# Patient Record
Sex: FEMALE | Race: WHITE | Employment: OTHER | URBAN - METROPOLITAN AREA
[De-identification: names, ages, dates, MRNs, and addresses within clinical notes are randomized per-mention and may not be internally consistent; named-entity substitution may affect disease eponyms.]

---

## 2018-12-20 ENCOUNTER — HOSPITAL ENCOUNTER (INPATIENT)
Age: 73
LOS: 5 days | Discharge: HOME HEALTH | DRG: 560 | End: 2018-12-25
Attending: PHYSICAL MEDICINE & REHABILITATION | Admitting: PHYSICAL MEDICINE & REHABILITATION
Payer: MEDICARE

## 2018-12-20 DIAGNOSIS — Z98.1 S/P LUMBAR SPINAL FUSION: Primary | ICD-10-CM

## 2018-12-20 PROBLEM — R26.9 GAIT DISTURBANCE: Status: ACTIVE | Noted: 2018-12-20

## 2018-12-20 PROBLEM — M47.816 SPONDYLOSIS OF LUMBAR SPINE: Status: ACTIVE | Noted: 2018-12-20

## 2018-12-20 PROBLEM — E11.65 UNCONTROLLED TYPE 2 DIABETES MELLITUS WITH HYPERGLYCEMIA (HCC): Status: ACTIVE | Noted: 2018-12-20

## 2018-12-20 PROBLEM — M48.061 SPINAL STENOSIS OF LUMBAR REGION WITH RADICULOPATHY: Status: ACTIVE | Noted: 2018-12-20

## 2018-12-20 PROBLEM — N99.89 POSTOPERATIVE URINARY RETENTION: Status: ACTIVE | Noted: 2018-12-20

## 2018-12-20 PROBLEM — I10 ESSENTIAL HYPERTENSION: Status: ACTIVE | Noted: 2018-12-20

## 2018-12-20 PROBLEM — R52 UNCONTROLLED PAIN: Status: ACTIVE | Noted: 2018-12-20

## 2018-12-20 PROBLEM — R33.8 POSTOPERATIVE URINARY RETENTION: Status: ACTIVE | Noted: 2018-12-20

## 2018-12-20 PROBLEM — M54.16 SPINAL STENOSIS OF LUMBAR REGION WITH RADICULOPATHY: Status: ACTIVE | Noted: 2018-12-20

## 2018-12-20 LAB
GLUCOSE BLD-MCNC: 143 MG/DL (ref 70–99)
GLUCOSE BLD-MCNC: 181 MG/DL (ref 70–99)

## 2018-12-20 PROCEDURE — 51798 US URINE CAPACITY MEASURE: CPT

## 2018-12-20 PROCEDURE — 51701 INSERT BLADDER CATHETER: CPT

## 2018-12-20 PROCEDURE — 1280000000 HC REHAB R&B

## 2018-12-20 PROCEDURE — 99223 1ST HOSP IP/OBS HIGH 75: CPT | Performed by: PHYSICAL MEDICINE & REHABILITATION

## 2018-12-20 PROCEDURE — 82962 GLUCOSE BLOOD TEST: CPT

## 2018-12-20 PROCEDURE — 6370000000 HC RX 637 (ALT 250 FOR IP): Performed by: PHYSICAL MEDICINE & REHABILITATION

## 2018-12-20 RX ORDER — ASPIRIN 81 MG/1
81 TABLET, CHEWABLE ORAL DAILY
Status: DISCONTINUED | OUTPATIENT
Start: 2018-12-21 | End: 2018-12-25 | Stop reason: HOSPADM

## 2018-12-20 RX ORDER — GLYBURIDE 5 MG/1
10 TABLET ORAL 2 TIMES DAILY WITH MEALS
Status: DISCONTINUED | OUTPATIENT
Start: 2018-12-20 | End: 2018-12-25 | Stop reason: HOSPADM

## 2018-12-20 RX ORDER — ASPIRIN 81 MG/1
81 TABLET, CHEWABLE ORAL DAILY
COMMUNITY

## 2018-12-20 RX ORDER — BISACODYL 10 MG
10 SUPPOSITORY, RECTAL RECTAL DAILY PRN
Status: DISCONTINUED | OUTPATIENT
Start: 2018-12-20 | End: 2018-12-25 | Stop reason: HOSPADM

## 2018-12-20 RX ORDER — DEXTROSE MONOHYDRATE 50 MG/ML
100 INJECTION, SOLUTION INTRAVENOUS PRN
Status: DISCONTINUED | OUTPATIENT
Start: 2018-12-20 | End: 2018-12-25 | Stop reason: HOSPADM

## 2018-12-20 RX ORDER — PANTOPRAZOLE SODIUM 40 MG/1
40 TABLET, DELAYED RELEASE ORAL
Status: DISCONTINUED | OUTPATIENT
Start: 2018-12-21 | End: 2018-12-25 | Stop reason: HOSPADM

## 2018-12-20 RX ORDER — ACETAMINOPHEN 325 MG/1
650 TABLET ORAL EVERY 4 HOURS PRN
Status: DISCONTINUED | OUTPATIENT
Start: 2018-12-20 | End: 2018-12-25 | Stop reason: HOSPADM

## 2018-12-20 RX ORDER — DOCUSATE SODIUM 100 MG/1
100 CAPSULE, LIQUID FILLED ORAL DAILY
Status: DISCONTINUED | OUTPATIENT
Start: 2018-12-21 | End: 2018-12-25 | Stop reason: HOSPADM

## 2018-12-20 RX ORDER — POTASSIUM CHLORIDE 20 MEQ/1
20 TABLET, EXTENDED RELEASE ORAL 2 TIMES DAILY WITH MEALS
Status: DISCONTINUED | OUTPATIENT
Start: 2018-12-20 | End: 2018-12-25 | Stop reason: HOSPADM

## 2018-12-20 RX ORDER — TRIAMTERENE AND HYDROCHLOROTHIAZIDE 37.5; 25 MG/1; MG/1
1 TABLET ORAL DAILY
Status: DISCONTINUED | OUTPATIENT
Start: 2018-12-21 | End: 2018-12-25 | Stop reason: HOSPADM

## 2018-12-20 RX ORDER — OXYCODONE HYDROCHLORIDE AND ACETAMINOPHEN 5; 325 MG/1; MG/1
2 TABLET ORAL EVERY 4 HOURS PRN
Status: DISCONTINUED | OUTPATIENT
Start: 2018-12-20 | End: 2018-12-25 | Stop reason: HOSPADM

## 2018-12-20 RX ORDER — CETIRIZINE HYDROCHLORIDE 10 MG/1
10 TABLET ORAL NIGHTLY
Status: DISCONTINUED | OUTPATIENT
Start: 2018-12-20 | End: 2018-12-25 | Stop reason: HOSPADM

## 2018-12-20 RX ORDER — POTASSIUM CHLORIDE 20 MEQ/1
20 TABLET, EXTENDED RELEASE ORAL 2 TIMES DAILY
COMMUNITY

## 2018-12-20 RX ORDER — DEXTROSE MONOHYDRATE 25 G/50ML
12.5 INJECTION, SOLUTION INTRAVENOUS PRN
Status: DISCONTINUED | OUTPATIENT
Start: 2018-12-20 | End: 2018-12-25 | Stop reason: HOSPADM

## 2018-12-20 RX ORDER — OXYCODONE HYDROCHLORIDE AND ACETAMINOPHEN 5; 325 MG/1; MG/1
1 TABLET ORAL EVERY 4 HOURS PRN
Status: DISCONTINUED | OUTPATIENT
Start: 2018-12-20 | End: 2018-12-25 | Stop reason: HOSPADM

## 2018-12-20 RX ORDER — ATENOLOL 50 MG/1
50 TABLET ORAL 2 TIMES DAILY
Status: DISCONTINUED | OUTPATIENT
Start: 2018-12-20 | End: 2018-12-25 | Stop reason: HOSPADM

## 2018-12-20 RX ORDER — NICOTINE POLACRILEX 4 MG
15 LOZENGE BUCCAL PRN
Status: DISCONTINUED | OUTPATIENT
Start: 2018-12-20 | End: 2018-12-25 | Stop reason: HOSPADM

## 2018-12-20 RX ORDER — INSULIN GLARGINE 100 [IU]/ML
24 INJECTION, SOLUTION SUBCUTANEOUS NIGHTLY
Status: DISCONTINUED | OUTPATIENT
Start: 2018-12-20 | End: 2018-12-25 | Stop reason: HOSPADM

## 2018-12-20 RX ORDER — OMEGA-3-ACID ETHYL ESTERS 1 G/1
1 CAPSULE, LIQUID FILLED ORAL 2 TIMES DAILY
Status: DISCONTINUED | OUTPATIENT
Start: 2018-12-20 | End: 2018-12-25 | Stop reason: HOSPADM

## 2018-12-20 RX ADMIN — ATENOLOL 50 MG: 50 TABLET ORAL at 20:58

## 2018-12-20 RX ADMIN — GLYBURIDE 10 MG: 5 TABLET ORAL at 18:48

## 2018-12-20 RX ADMIN — INSULIN LISPRO 1 UNITS: 100 INJECTION, SOLUTION INTRAVENOUS; SUBCUTANEOUS at 21:05

## 2018-12-20 RX ADMIN — POTASSIUM CHLORIDE 20 MEQ: 20 TABLET, EXTENDED RELEASE ORAL at 18:48

## 2018-12-20 RX ADMIN — OXYCODONE AND ACETAMINOPHEN 1 TABLET: 5; 325 TABLET ORAL at 20:57

## 2018-12-20 RX ADMIN — INSULIN GLARGINE 24 UNITS: 100 INJECTION, SOLUTION SUBCUTANEOUS at 21:04

## 2018-12-20 RX ADMIN — OMEGA-3-ACID ETHYL ESTERS 1 G: 1 CAPSULE, LIQUID FILLED ORAL at 20:58

## 2018-12-20 RX ADMIN — CETIRIZINE HYDROCHLORIDE 10 MG: 10 TABLET, FILM COATED ORAL at 21:03

## 2018-12-20 ASSESSMENT — PAIN DESCRIPTION - PAIN TYPE: TYPE: CHRONIC PAIN

## 2018-12-20 ASSESSMENT — PAIN SCALES - GENERAL
PAINLEVEL_OUTOF10: 5
PAINLEVEL_OUTOF10: 8

## 2018-12-20 NOTE — PLAN OF CARE
management and Education  Precautions   [] Weight Bearing Precautions   [] Swallowing Precautions   [x] Monitoring of Risks of Complications   [x] DVT Prophylaxis    [x] Fluid/electrolyte/Nutrition Management    [x] Complete education with patient/family with understanding demonstrated for          in-room safety with transfers to bed, toilet, wheelchair, shower as well as                bathroom activities and hygiene. [] Adjustment   [] Other:   Nursing interventions may include bowel/bladder training, education for medical assistive devices, medication education, O2 saturation management, energy conservation, stress management techniques, fall prevention, alarms protocol, seating and positioning, skin/wound care, pressure relief instruction,dressing changes,  infection protection, DVT prophylaxis, and/or assistance with in room safety with transfers to bed, toilet, wheelchair, shower as well as bathroom activities and hygiene.      Patient/caregiver education for:   [x] Disease/sustained injury/management      [x] Medication Use   [] Surgical intervention   [x] Safety/Precautions   [x] Body mechanics and or joint protection   [x] Health maintenance         PHYSICAL THERAPY:  Goals:                  Short term goals  Time Frame for Short term goals: 7-10 days  Short term goal 1: pt will be mod I c bed mobility, demonstrating safety with a log-roll technique  Short term goal 2: pt will be mod I c transfers, including toilet transfers, bed to chair, car transfers, sit/stands  Short term goal 3: pt will be mod I c ambulating 150 ft c LRAD   Short term goal 4: pt will be mod I c ascending 1 curb step c LRAD, in order to safely navigate the community  Short term goal 5: pt will be mod I c higher level mobility, including ambulating over uneven surfaces, picking an object up from the floor and dynamic standing activities with unilateral UE support, in order to increase overall independence throughout the community Long term goals  Time Frame for Long term goals : LTG = STG  Long term goal 1: Pt will be able to recall spinal precautions and describe functional applications of spinal precautions 3/3 times. Long term goal 2: Pt will be independent with a HEP to promote further BLE and core strength, to promote longterm strength and endurance  Long term goal 3: Pt will report reduced pain to a 4/10 with activity. Long term goal 4: Pt will be mod I c ascending/descending 4 stairs. These goals were reviewed with this patient at the time of assessment and Leslie Vicente is in agreement. Plan of Care: Pt to be seen 5 days per week for a minimum of 60 minutes for  7-10 days. Current Treatment Recommendations: Strengthening, ROM, Transfer Training, Functional Mobility Training, IADL Training, Gait Training, Stair training, Endurance Training, Balance Training, Manual Therapy - Joint Manipulation, Manual Therapy - Soft Tissue Mobilization, Home Exercise Program, Equipment Evaluation, Education, & procurement, Modalities, Safety Education & Training, Pain Management, Patient/Caregiver Education & Training community reintegration,and concurrent/group therapy. OCCUPATIONAL THERAPY:  Goals:             Short term goals  Time Frame for Short term goals: STGs=LTGs :  Long term goals  Time Frame for Long term goals : 7-10 days or until d/c. Long term goal 1: Pt will complete toileting mod I.  Long term goal 2: Pt will complete UB dressing I; LB dressing mod I using AE. Long term goal 3: Pt will complete grooming tasks I. Long term goal 4: Pt will complete total body bathing mod I.  Long term goal 5: Pt will complete functional transfers (bed, chair, shower, toilet) c DME PRN and mod I.   Long term goals 6: Pt will perform therex/therax to facilitate increased strength/endurance/ax tolerance (c emphasis on dynamic standing balance/tolerance >12 mins) c SBA.   Long term goal 7: Pt will complete simple

## 2018-12-21 LAB
ANION GAP SERPL CALCULATED.3IONS-SCNC: 11 MMOL/L (ref 4–16)
BUN BLDV-MCNC: 17 MG/DL (ref 6–23)
CALCIUM SERPL-MCNC: 8.5 MG/DL (ref 8.3–10.6)
CHLORIDE BLD-SCNC: 98 MMOL/L (ref 99–110)
CO2: 27 MMOL/L (ref 21–32)
CREAT SERPL-MCNC: 1 MG/DL (ref 0.6–1.1)
GFR AFRICAN AMERICAN: >60 ML/MIN/1.73M2
GFR NON-AFRICAN AMERICAN: 54 ML/MIN/1.73M2
GLUCOSE BLD-MCNC: 150 MG/DL (ref 70–99)
GLUCOSE BLD-MCNC: 162 MG/DL (ref 70–99)
GLUCOSE BLD-MCNC: 162 MG/DL (ref 70–99)
GLUCOSE BLD-MCNC: 166 MG/DL (ref 70–99)
GLUCOSE BLD-MCNC: 189 MG/DL (ref 70–99)
HCT VFR BLD CALC: 40.3 % (ref 37–47)
HEMOGLOBIN: 13.1 GM/DL (ref 12.5–16)
MCH RBC QN AUTO: 31.2 PG (ref 27–31)
MCHC RBC AUTO-ENTMCNC: 32.5 % (ref 32–36)
MCV RBC AUTO: 96 FL (ref 78–100)
PDW BLD-RTO: 13.2 % (ref 11.7–14.9)
PLATELET # BLD: 111 K/CU MM (ref 140–440)
PMV BLD AUTO: 10.1 FL (ref 7.5–11.1)
POTASSIUM SERPL-SCNC: 3.5 MMOL/L (ref 3.5–5.1)
RBC # BLD: 4.2 M/CU MM (ref 4.2–5.4)
SODIUM BLD-SCNC: 136 MMOL/L (ref 135–145)
WBC # BLD: 7.8 K/CU MM (ref 4–10.5)

## 2018-12-21 PROCEDURE — 97530 THERAPEUTIC ACTIVITIES: CPT

## 2018-12-21 PROCEDURE — 1280000000 HC REHAB R&B

## 2018-12-21 PROCEDURE — 97166 OT EVAL MOD COMPLEX 45 MIN: CPT

## 2018-12-21 PROCEDURE — 94761 N-INVAS EAR/PLS OXIMETRY MLT: CPT

## 2018-12-21 PROCEDURE — 6370000000 HC RX 637 (ALT 250 FOR IP): Performed by: PHYSICAL MEDICINE & REHABILITATION

## 2018-12-21 PROCEDURE — 97116 GAIT TRAINING THERAPY: CPT

## 2018-12-21 PROCEDURE — 94664 DEMO&/EVAL PT USE INHALER: CPT

## 2018-12-21 PROCEDURE — 51798 US URINE CAPACITY MEASURE: CPT

## 2018-12-21 PROCEDURE — 36415 COLL VENOUS BLD VENIPUNCTURE: CPT

## 2018-12-21 PROCEDURE — 97535 SELF CARE MNGMENT TRAINING: CPT

## 2018-12-21 PROCEDURE — 97110 THERAPEUTIC EXERCISES: CPT

## 2018-12-21 PROCEDURE — 80048 BASIC METABOLIC PNL TOTAL CA: CPT

## 2018-12-21 PROCEDURE — 99232 SBSQ HOSP IP/OBS MODERATE 35: CPT | Performed by: PHYSICAL MEDICINE & REHABILITATION

## 2018-12-21 PROCEDURE — 2500000003 HC RX 250 WO HCPCS: Performed by: PHYSICAL MEDICINE & REHABILITATION

## 2018-12-21 PROCEDURE — 82962 GLUCOSE BLOOD TEST: CPT

## 2018-12-21 PROCEDURE — 85027 COMPLETE CBC AUTOMATED: CPT

## 2018-12-21 PROCEDURE — 94150 VITAL CAPACITY TEST: CPT

## 2018-12-21 PROCEDURE — 97150 GROUP THERAPEUTIC PROCEDURES: CPT

## 2018-12-21 PROCEDURE — 97162 PT EVAL MOD COMPLEX 30 MIN: CPT

## 2018-12-21 RX ORDER — NYSTATIN 100000 [USP'U]/G
POWDER TOPICAL 2 TIMES DAILY PRN
COMMUNITY

## 2018-12-21 RX ORDER — BLACK COHOSH ROOT EXTRACT 80 MG
1 CAPSULE ORAL 2 TIMES DAILY
COMMUNITY

## 2018-12-21 RX ORDER — AMPICILLIN TRIHYDRATE 250 MG
2000 CAPSULE ORAL NIGHTLY
Status: ON HOLD | COMMUNITY
End: 2018-12-24 | Stop reason: HOSPADM

## 2018-12-21 RX ORDER — SENNA PLUS 8.6 MG/1
2 TABLET ORAL NIGHTLY
Status: DISCONTINUED | OUTPATIENT
Start: 2018-12-21 | End: 2018-12-22

## 2018-12-21 RX ORDER — CEPHALEXIN 500 MG/1
500 CAPSULE ORAL DAILY PRN
Status: ON HOLD | COMMUNITY
End: 2018-12-24 | Stop reason: HOSPADM

## 2018-12-21 RX ORDER — MECLIZINE HYDROCHLORIDE 25 MG/1
25 TABLET ORAL EVERY 4 HOURS PRN
Status: ON HOLD | COMMUNITY
End: 2018-12-24 | Stop reason: HOSPADM

## 2018-12-21 RX ADMIN — ASPIRIN 81 MG 81 MG: 81 TABLET ORAL at 08:32

## 2018-12-21 RX ADMIN — INSULIN LISPRO 1 UNITS: 100 INJECTION, SOLUTION INTRAVENOUS; SUBCUTANEOUS at 20:36

## 2018-12-21 RX ADMIN — OXYCODONE AND ACETAMINOPHEN 2 TABLET: 5; 325 TABLET ORAL at 13:16

## 2018-12-21 RX ADMIN — INSULIN LISPRO 1 UNITS: 100 INJECTION, SOLUTION INTRAVENOUS; SUBCUTANEOUS at 08:29

## 2018-12-21 RX ADMIN — OXYCODONE AND ACETAMINOPHEN 1 TABLET: 5; 325 TABLET ORAL at 20:31

## 2018-12-21 RX ADMIN — POTASSIUM CHLORIDE 20 MEQ: 20 TABLET, EXTENDED RELEASE ORAL at 18:01

## 2018-12-21 RX ADMIN — PANTOPRAZOLE SODIUM 40 MG: 40 TABLET, DELAYED RELEASE ORAL at 06:45

## 2018-12-21 RX ADMIN — INSULIN LISPRO 1 UNITS: 100 INJECTION, SOLUTION INTRAVENOUS; SUBCUTANEOUS at 13:15

## 2018-12-21 RX ADMIN — CETIRIZINE HYDROCHLORIDE 10 MG: 10 TABLET, FILM COATED ORAL at 20:30

## 2018-12-21 RX ADMIN — TRIAMTERENE AND HYDROCHLOROTHIAZIDE 1 TABLET: 37.5; 25 TABLET ORAL at 08:32

## 2018-12-21 RX ADMIN — ATENOLOL 50 MG: 50 TABLET ORAL at 08:32

## 2018-12-21 RX ADMIN — OXYCODONE AND ACETAMINOPHEN 1 TABLET: 5; 325 TABLET ORAL at 08:32

## 2018-12-21 RX ADMIN — POTASSIUM CHLORIDE 20 MEQ: 20 TABLET, EXTENDED RELEASE ORAL at 08:32

## 2018-12-21 RX ADMIN — OMEGA-3-ACID ETHYL ESTERS 1 G: 1 CAPSULE, LIQUID FILLED ORAL at 20:30

## 2018-12-21 RX ADMIN — INSULIN GLARGINE 24 UNITS: 100 INJECTION, SOLUTION SUBCUTANEOUS at 20:37

## 2018-12-21 RX ADMIN — GLYBURIDE 10 MG: 5 TABLET ORAL at 08:30

## 2018-12-21 RX ADMIN — GLYBURIDE 10 MG: 5 TABLET ORAL at 18:01

## 2018-12-21 RX ADMIN — SENNOSIDES 17.2 MG: 8.6 TABLET, FILM COATED ORAL at 20:30

## 2018-12-21 RX ADMIN — OMEGA-3-ACID ETHYL ESTERS 1 G: 1 CAPSULE, LIQUID FILLED ORAL at 08:32

## 2018-12-21 RX ADMIN — MICONAZOLE NITRATE: 2 POWDER TOPICAL at 20:41

## 2018-12-21 RX ADMIN — ATENOLOL 50 MG: 50 TABLET ORAL at 20:33

## 2018-12-21 RX ADMIN — INSULIN LISPRO 1 UNITS: 100 INJECTION, SOLUTION INTRAVENOUS; SUBCUTANEOUS at 17:54

## 2018-12-21 RX ADMIN — OXYCODONE AND ACETAMINOPHEN 1 TABLET: 5; 325 TABLET ORAL at 04:00

## 2018-12-21 ASSESSMENT — PAIN SCALES - GENERAL
PAINLEVEL_OUTOF10: 7
PAINLEVEL_OUTOF10: 0
PAINLEVEL_OUTOF10: 0
PAINLEVEL_OUTOF10: 4
PAINLEVEL_OUTOF10: 7
PAINLEVEL_OUTOF10: 4
PAINLEVEL_OUTOF10: 7
PAINLEVEL_OUTOF10: 2

## 2018-12-21 NOTE — PROGRESS NOTES
Standard (Uses vanity on R to assist with transfer)  Bathroom Equipment: Grab bars in shower, Shower chair, Grab bars around toilet  Bathroom Accessibility: Walker accessible  Home Equipment: 4 wheeled walker, Lift chair, [address] Help From: Home health (Aide 2x/week (2 hrs each Monday and Thursday))  ADL Assistance: [address]: Needs assistance  Homemaking Responsibilities: Yes  Meal Prep Responsibility: Secondary (Has Meals on Wheels; uses microwave or Crock Pot mostly)  Laundry Responsibility: No (Aide performs)  Cleaning Responsibility: Secondary (Aide mostly performs)  Bill Paying/Finance Responsibility: Primary  Shopping Responsibility: Primary (Daughter would help only occasionally)  Dependent Care Responsibility: No  Health Care Management: Primary (Uses pillbox)  Ambulation Assistance: Independent (Mod I c [id number])  Transfer Assistance: Independent  Active : Yes  Mode of Transportation: Car  Occupation: Retired  Type of occupation: Bookkeeping/data processing for [employer]   Leisure & Hobbies: Read, cross stitching, knitting  Additional Comments: Pt sleeps in a flat, regular bed at home. Pt reports 2 falls in the past year without injury.     Objective     Observation/Palpation  Posture: Fair  Edema: BLE edema observed    PROM RLE (degrees)  RLE General PROM: Passive tension observed in hamstring, glutes, TFL & piriformis   AROM RLE (degrees)  RLE AROM: WFL  PROM LLE (degrees)  LLE General PROM: Passive tension observed in hamstring, glutes, TFL & piriformis   AROM LLE (degrees)  LLE AROM : WFL  Strength RLE  Comment: 4+/5 knee extension, knee flexion, dorsiflexion  Strength LLE  Comment: 4+/5 knee extension, knee flexion, dorsiflexion     Sensation  Overall Sensation Status: WFL  Bed mobility  Bridging: Stand by assistance  Rolling to Left: Supervision  Rolling to Right: Supervision  Supine to Sit: Supervision  Sit to Supine: Supervision  Transfers  Sit to Stand: Contact

## 2018-12-21 NOTE — H&P
[address] [place] [address], [address] [place] [address]                              HISTORY AND PHYSICAL    PATIENT NAME: BREANNE HUNTER                     :        1945  MED REC NO:   [id number]                          ROOM:       1024  ACCOUNT NO:   [de-identified]                           ADMIT DATE: 2018  PROVIDER:     Luna Casas MD    ADMITTING DIAGNOSIS:  Lumbar spinal stenosis with radiculopathy. COMORBID DIAGNOSES IMPACTING REHABILITATION:  Uncontrolled hypertension,  uncontrolled pain, diabetes, urinary retention, morbid obesity, and gait  disturbance. CHIEF COMPLAINT:  Lower back pain and generalized weakness. HISTORY OF PRESENT ILLNESS:  The patient is a 70-year-old right-hand  dominant female who has struggled with chronic lower back pain and leg  weakness for several years. In the recent months and weeks, her right  hip and lower leg pain with weakness escalated to the point where she  was unable to safely do standing ADLs or community ambulation. Outpatient therapies and medications failed to correct her symptoms, so  on 2018, Dr. Azul Pemberton performed a lumbar laminectomy at L2-3 level. Postoperatively, she has had some urinary retention requiring a Bee  catheter, fluctuating blood pressures, and elevated blood sugar. Her  pain is poorly controlled. She has not had a bowel movement since  admission. She denies a change in memory, speech, or swallowing. The remainder of her review of systems is negative. SOCIAL HISTORY:  The patient is unmarried, living alone in a single-step  entry single-story home. Premorbidly, she was independent with a cane  occasionally and doing furniture walking. She does some limited local  driving. She does not use tobacco nor alcohol. She was doing her own  medications, personal care, and meal prep.     ALLERGIES:  CODEINE, PENICILLIN, ADHESIVE, ULTRAM,

## 2018-12-21 NOTE — PROGRESS NOTES
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 Pt reports 2 falls in the past year without injury. Restrictions:  Restrictions/Precautions  Restrictions/Precautions: General Precautions, Fall Risk, Surgical Protocols (Spinal precautions; no showering for 1 week)        Position Activity Restriction  Spinal Precautions: No Bending, No Lifting, No Twisting (>5#)         Pain Level: 7       Objective:  Observation/Palpation  Posture: Fair  Observation: Pt in high fowlers on approach, pleasant and agreeable to evaluation, despite reporting high pain levels. Scar: Dressing intact to lumbar incision    Orientation  Overall Orientation Status: Within Functional Limits        Vision  Vision: Impaired  Vision Exceptions: Wears glasses at all times  Vision - Basic Assessment  Prior Vision: Wears glasses all the time  Patient Visual Report: No visual complaint reported. Hearing  Hearing: Exceptions to [facility]  Hearing Exceptions: Hard of hearing/hearing concerns, Bilateral hearing aid    ROM:      LUE AROM (degrees)  LUE AROM : WNL     Left Hand AROM (degrees)  Left Hand AROM: WNL     RUE AROM (degrees)  RUE AROM : WNL     Right Hand AROM (degrees)  Right Hand AROM: WNL    Strength:    LUE Strength  Gross LUE Strength: WFL  L Hand Grasp: 4+/5  LUE Strength Comment: Observed in function d/t spinal precautions; 4+/5 grossly  RUE Strength  Gross RUE Strength: WFL  R Hand Grasp: 4+/5  RUE Strength Comment: Observed in function d/t spinal precautions; 4+/5 grossly    Quality of Movement:   Tone RUE  RUE Tone: Normotonic  Tone LUE  LUE Tone: Normotonic  Coordination  Movements Are Fluid And Coordinated: Yes       Sensation:    Sensation  Overall Sensation Status: WFL    ADL's:    ADL  Equipment Provided: Reacher, Sock aid, Long-handled shoe horn, Long-handled sponge (at end of evaluation to implement first

## 2018-12-22 LAB
GLUCOSE BLD-MCNC: 135 MG/DL (ref 70–99)
GLUCOSE BLD-MCNC: 147 MG/DL (ref 70–99)
GLUCOSE BLD-MCNC: 172 MG/DL (ref 70–99)
GLUCOSE BLD-MCNC: 93 MG/DL (ref 70–99)

## 2018-12-22 PROCEDURE — 97116 GAIT TRAINING THERAPY: CPT

## 2018-12-22 PROCEDURE — 82962 GLUCOSE BLOOD TEST: CPT

## 2018-12-22 PROCEDURE — 6370000000 HC RX 637 (ALT 250 FOR IP): Performed by: PHYSICAL MEDICINE & REHABILITATION

## 2018-12-22 PROCEDURE — 97535 SELF CARE MNGMENT TRAINING: CPT

## 2018-12-22 PROCEDURE — 97530 THERAPEUTIC ACTIVITIES: CPT

## 2018-12-22 PROCEDURE — 97150 GROUP THERAPEUTIC PROCEDURES: CPT

## 2018-12-22 PROCEDURE — 97112 NEUROMUSCULAR REEDUCATION: CPT

## 2018-12-22 PROCEDURE — 97110 THERAPEUTIC EXERCISES: CPT

## 2018-12-22 PROCEDURE — 1280000000 HC REHAB R&B

## 2018-12-22 RX ORDER — SENNA PLUS 8.6 MG/1
2 TABLET ORAL 2 TIMES DAILY
Status: DISCONTINUED | OUTPATIENT
Start: 2018-12-22 | End: 2018-12-25 | Stop reason: HOSPADM

## 2018-12-22 RX ADMIN — OMEGA-3-ACID ETHYL ESTERS 1 G: 1 CAPSULE, LIQUID FILLED ORAL at 09:22

## 2018-12-22 RX ADMIN — OMEGA-3-ACID ETHYL ESTERS 1 G: 1 CAPSULE, LIQUID FILLED ORAL at 23:00

## 2018-12-22 RX ADMIN — GLYBURIDE 10 MG: 5 TABLET ORAL at 17:18

## 2018-12-22 RX ADMIN — OXYCODONE AND ACETAMINOPHEN 2 TABLET: 5; 325 TABLET ORAL at 10:11

## 2018-12-22 RX ADMIN — OXYCODONE AND ACETAMINOPHEN 1 TABLET: 5; 325 TABLET ORAL at 23:01

## 2018-12-22 RX ADMIN — MICONAZOLE NITRATE: 2 POWDER TOPICAL at 23:06

## 2018-12-22 RX ADMIN — CETIRIZINE HYDROCHLORIDE 10 MG: 10 TABLET, FILM COATED ORAL at 23:01

## 2018-12-22 RX ADMIN — MICONAZOLE NITRATE: 2 POWDER TOPICAL at 09:24

## 2018-12-22 RX ADMIN — SENNOSIDES 17.2 MG: 8.6 TABLET, FILM COATED ORAL at 12:42

## 2018-12-22 RX ADMIN — ASPIRIN 81 MG 81 MG: 81 TABLET ORAL at 10:15

## 2018-12-22 RX ADMIN — POTASSIUM CHLORIDE 20 MEQ: 20 TABLET, EXTENDED RELEASE ORAL at 16:41

## 2018-12-22 RX ADMIN — ATENOLOL 50 MG: 50 TABLET ORAL at 23:01

## 2018-12-22 RX ADMIN — METFORMIN HYDROCHLORIDE 500 MG: 500 TABLET ORAL at 12:42

## 2018-12-22 RX ADMIN — ATENOLOL 50 MG: 50 TABLET ORAL at 11:00

## 2018-12-22 RX ADMIN — TRIAMTERENE AND HYDROCHLOROTHIAZIDE 1 TABLET: 37.5; 25 TABLET ORAL at 09:22

## 2018-12-22 RX ADMIN — POTASSIUM CHLORIDE 20 MEQ: 20 TABLET, EXTENDED RELEASE ORAL at 09:22

## 2018-12-22 RX ADMIN — INSULIN LISPRO 1 UNITS: 100 INJECTION, SOLUTION INTRAVENOUS; SUBCUTANEOUS at 12:43

## 2018-12-22 RX ADMIN — METFORMIN HYDROCHLORIDE 500 MG: 500 TABLET ORAL at 16:41

## 2018-12-22 RX ADMIN — PANTOPRAZOLE SODIUM 40 MG: 40 TABLET, DELAYED RELEASE ORAL at 06:03

## 2018-12-22 RX ADMIN — BISACODYL 10 MG: 10 SUPPOSITORY RECTAL at 14:21

## 2018-12-22 RX ADMIN — INSULIN GLARGINE 24 UNITS: 100 INJECTION, SOLUTION SUBCUTANEOUS at 23:12

## 2018-12-22 RX ADMIN — GLYBURIDE 10 MG: 5 TABLET ORAL at 09:23

## 2018-12-22 RX ADMIN — INSULIN LISPRO 1 UNITS: 100 INJECTION, SOLUTION INTRAVENOUS; SUBCUTANEOUS at 23:13

## 2018-12-22 RX ADMIN — MAGNESIUM HYDROXIDE 30 ML: 400 SUSPENSION ORAL at 06:43

## 2018-12-22 RX ADMIN — MAGESIUM CITRATE 296 ML: 1.75 LIQUID ORAL at 17:32

## 2018-12-22 RX ADMIN — OXYCODONE AND ACETAMINOPHEN 1 TABLET: 5; 325 TABLET ORAL at 06:03

## 2018-12-22 ASSESSMENT — PAIN SCALES - GENERAL
PAINLEVEL_OUTOF10: 7
PAINLEVEL_OUTOF10: 6
PAINLEVEL_OUTOF10: 4

## 2018-12-22 ASSESSMENT — PAIN DESCRIPTION - LOCATION: LOCATION: BACK

## 2018-12-22 ASSESSMENT — PAIN DESCRIPTION - DESCRIPTORS: DESCRIPTORS: ACHING;SORE

## 2018-12-22 NOTE — PROGRESS NOTES
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 Pt reports 2 falls in the past year without injury. Objective                                                                                    Goals:  (Update in navigator)  Short term goals  Time Frame for Short term goals: STGs=LTGs:  Long term goals  Time Frame for Long term goals : 7-10 days or until d/c. Long term goal 1: Pt will complete toileting mod I.  Long term goal 2: Pt will complete UB dressing I; LB dressing mod I using AE. Long term goal 3: Pt will complete grooming tasks I. Long term goal 4: Pt will complete total body bathing mod I.  Long term goal 5: Pt will complete functional transfers (bed, chair, shower, toilet) c DME PRN and mod I.   Long term goals 6: Pt will perform therex/therax to facilitate increased strength/endurance/ax tolerance (c emphasis on dynamic standing balance/tolerance >12 mins) c SBA. Long term goal 7: Pt will complete simple homemaking tasks c DME PRN and mod I.  :        Plan of Care                                                                              Times per week: 5 days per week for a minimum of 60 minutes/day plus group as appropriate for 60 minutes.   Treatment to include Plan  Times per day: Daily  Current Treatment Recommendations: Balance Training, Functional Mobility Training, Endurance Training, Pain Management, Safety Education & Training, Patient/Caregiver Education & Training, Equipment Evaluation, Education, & procurement, Self-Care / ADL, Home Management Training    Electronically signed by   ASHLEIGH Martinez  12/22/2018, 9:53 AM

## 2018-12-22 NOTE — PROGRESS NOTES
was updated and reviewed in Rehabtracker with patient and/or family this date. Total time in group = 60 minutes  General Exercise and Balance Group: Participation in exercise with discussion of benefits and precautions during exercise was targeted during this group. Exercises include UE & LE strengthening, endurance, cardio, and flexibility. This provides the opportunity for pt & group members to have fun, build camaraderie, increase endurance, improve breathing techniques, balance, and strength. Also focused on warm-up, warm-down, endurance monitoring & strategies, problem solving, functional mobility, safety, and general social & communication opportunities with other group members. Functional areas targeted:   [] Communication [] Memory  [] Coordination    [] Kitchen Safety [] Problem Solving  [x] Strengthening     [] Attention  [x] Endurance   [] Mobility    [] Awareness/Insight [x] Balance  [] Transfers  [] Social/Pragmatics [] Sequencing  [] Equipment Use    [] Safety   [] Other (specify)    Participation of [employer]:  [x] Actively participated    [x] Required verbal cues for attention to spinal precautions & avoiding muscle compensation with exercises  [] Other (specify)    Education completed: Pt educated on importance of remaining active following discharge to maintain strength & ROM in B UE & B LE. Pt also educated on understanding personal limits with therapeutic exercise.   Cognitive fx during this group:   Family present: NO       Treatment/Activity Tolerance:   [] Tolerated treatment with no adverse effects    [] Patient limited by fatigue  [x] Patient limited by pain   [] Patient limited by medical complications:    [] Adverse reaction to Tx:   [] Significant change in status    Safety:       []  bed alarm set    []  chair alarm set    []  Pt refused alarms                []  Telesitter activated      [x]  Gait belt used during tx session      []other:         Number of Minutes/Billable Intervention  Gait Training 30 minutes/ 2 units   Therapeutic Exercise 15 minutes/ 1 unit   Neuro Re-Ed    Therapeutic Activity 15 minutes/ 1 unit   Wheelchair Propulsion    Group 60 minutes/ 1 unit   Other:    TOTAL 120 minutes/ 5 units         Social History  Social/Functional History  Lives With: Alone  Type of Home: Apartment  Home Layout: One level (Pt is on 1st floor of building)  Home Access: Level entry  Bathroom Shower/Tub: Walk-in shower, Shower chair without back  Bathroom Toilet: Standard (Uses vanity on R to assist with transfer)  Bathroom Equipment: Grab bars in shower, Shower chair, Grab bars around toilet  Bathroom Accessibility: Walker accessible  Home Equipment: 4 wheeled walker, Lift chair, Reacher  Receives Help From: Home health (Aide 2x/week (2 hrs each Monday and Thursday))  ADL Assistance: Independent  Homemaking Assistance: Needs assistance  Homemaking Responsibilities: Yes  Meal Prep Responsibility: Secondary (Has Meals on Wheels; uses microwave or Crock Pot mostly)  Laundry Responsibility: No (Aide performs)  Cleaning Responsibility: Secondary (Aide mostly performs)  Bill Paying/Finance Responsibility: Primary  Shopping Responsibility: Primary (Daughter would help only occasionally)  Dependent Care Responsibility: No  Health Care Management: Primary (Uses pillbox)  Ambulation Assistance: Independent (Mod I c [id number])  Transfer Assistance: Independent  Active : Yes  Mode of Transportation: Car  Occupation: Retired  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 Pt reports 2 falls in the past year without injury.       Objective                                                                                    Goals:  (Update in navigator)  Short term goals  Time Frame for Short term goals: 7-10 days  Short term goal 1: pt will be mod I c bed mobility,

## 2018-12-23 LAB
GLUCOSE BLD-MCNC: 109 MG/DL (ref 70–99)
GLUCOSE BLD-MCNC: 126 MG/DL (ref 70–99)
GLUCOSE BLD-MCNC: 153 MG/DL (ref 70–99)
GLUCOSE BLD-MCNC: 170 MG/DL (ref 70–99)
GLUCOSE BLD-MCNC: 177 MG/DL (ref 70–99)

## 2018-12-23 PROCEDURE — 82962 GLUCOSE BLOOD TEST: CPT

## 2018-12-23 PROCEDURE — 97110 THERAPEUTIC EXERCISES: CPT

## 2018-12-23 PROCEDURE — 97116 GAIT TRAINING THERAPY: CPT

## 2018-12-23 PROCEDURE — 6370000000 HC RX 637 (ALT 250 FOR IP): Performed by: PHYSICAL MEDICINE & REHABILITATION

## 2018-12-23 PROCEDURE — 97150 GROUP THERAPEUTIC PROCEDURES: CPT

## 2018-12-23 PROCEDURE — 97530 THERAPEUTIC ACTIVITIES: CPT

## 2018-12-23 PROCEDURE — 97535 SELF CARE MNGMENT TRAINING: CPT

## 2018-12-23 PROCEDURE — 1280000000 HC REHAB R&B

## 2018-12-23 PROCEDURE — 94761 N-INVAS EAR/PLS OXIMETRY MLT: CPT

## 2018-12-23 RX ADMIN — POTASSIUM CHLORIDE 20 MEQ: 20 TABLET, EXTENDED RELEASE ORAL at 16:34

## 2018-12-23 RX ADMIN — GLYBURIDE 10 MG: 5 TABLET ORAL at 08:42

## 2018-12-23 RX ADMIN — INSULIN LISPRO 1 UNITS: 100 INJECTION, SOLUTION INTRAVENOUS; SUBCUTANEOUS at 19:57

## 2018-12-23 RX ADMIN — INSULIN LISPRO 1 UNITS: 100 INJECTION, SOLUTION INTRAVENOUS; SUBCUTANEOUS at 13:36

## 2018-12-23 RX ADMIN — MICONAZOLE NITRATE: 2 POWDER TOPICAL at 11:04

## 2018-12-23 RX ADMIN — TRIAMTERENE AND HYDROCHLOROTHIAZIDE 1 TABLET: 37.5; 25 TABLET ORAL at 08:42

## 2018-12-23 RX ADMIN — ASPIRIN 81 MG 81 MG: 81 TABLET ORAL at 08:43

## 2018-12-23 RX ADMIN — OXYCODONE AND ACETAMINOPHEN 2 TABLET: 5; 325 TABLET ORAL at 10:44

## 2018-12-23 RX ADMIN — ATENOLOL 50 MG: 50 TABLET ORAL at 08:43

## 2018-12-23 RX ADMIN — PANTOPRAZOLE SODIUM 40 MG: 40 TABLET, DELAYED RELEASE ORAL at 05:52

## 2018-12-23 RX ADMIN — OXYCODONE AND ACETAMINOPHEN 1 TABLET: 5; 325 TABLET ORAL at 16:34

## 2018-12-23 RX ADMIN — POTASSIUM CHLORIDE 20 MEQ: 20 TABLET, EXTENDED RELEASE ORAL at 10:45

## 2018-12-23 RX ADMIN — INSULIN GLARGINE 24 UNITS: 100 INJECTION, SOLUTION SUBCUTANEOUS at 19:57

## 2018-12-23 RX ADMIN — METFORMIN HYDROCHLORIDE 500 MG: 500 TABLET ORAL at 16:33

## 2018-12-23 RX ADMIN — METFORMIN HYDROCHLORIDE 500 MG: 500 TABLET ORAL at 08:43

## 2018-12-23 RX ADMIN — OMEGA-3-ACID ETHYL ESTERS 1 G: 1 CAPSULE, LIQUID FILLED ORAL at 08:43

## 2018-12-23 RX ADMIN — ATENOLOL 50 MG: 50 TABLET ORAL at 19:56

## 2018-12-23 RX ADMIN — CETIRIZINE HYDROCHLORIDE 10 MG: 10 TABLET, FILM COATED ORAL at 19:56

## 2018-12-23 RX ADMIN — MICONAZOLE NITRATE: 2 POWDER TOPICAL at 19:56

## 2018-12-23 RX ADMIN — GLYBURIDE 10 MG: 5 TABLET ORAL at 17:29

## 2018-12-23 RX ADMIN — OMEGA-3-ACID ETHYL ESTERS 1 G: 1 CAPSULE, LIQUID FILLED ORAL at 19:55

## 2018-12-23 RX ADMIN — OXYCODONE AND ACETAMINOPHEN 1 TABLET: 5; 325 TABLET ORAL at 05:52

## 2018-12-23 ASSESSMENT — PAIN SCALES - GENERAL
PAINLEVEL_OUTOF10: 5
PAINLEVEL_OUTOF10: 4
PAINLEVEL_OUTOF10: 7

## 2018-12-23 ASSESSMENT — PAIN DESCRIPTION - LOCATION
LOCATION: BACK

## 2018-12-23 ASSESSMENT — PAIN DESCRIPTION - DESCRIPTORS: DESCRIPTORS: ACHING;SORE;STABBING

## 2018-12-23 NOTE — PROGRESS NOTES
[x] daily progress note       [] discharge       Patient Name:  Mortimer Anis   :  1945 MRN: [id number]  Room:  [address] [place], [place] [address] Date of Admission: 2018  Rehabilitation Diagnosis:   Spondylosis of lumbar spine [M47.816]       Date 2018       Day of ARU Week:  4   Time IN/OUT 1402/1502   Individual Tx Minutes 60 minutes   Group Tx Minutes    Co-Treat Minutes    Concurrent Tx Minutes    TOTAL Tx Time Mins 60 minutes   Variance Time    Variance Time []   Refusal due to:     []   Medical hold/reason:    []   Illness   []   Off Unit for test/procedure  []   Extra time needed to complete task  []   Therapeutic need  []   Other (specify):   Restrictions Restrictions/Precautions  Restrictions/Precautions: General Precautions, Fall Risk, Surgical Protocols (Spinal precautions; no showering for 1 week)  Position Activity Restriction  Spinal Precautions: No Bending, No Lifting, No Twisting (>5#)   Communication with other providers: [x]   OK to see per nursing:     []   Spoke with team member regarding:      Subjective observations and cognitive status: Pt seated in [facility] upon therapist arrival with reports of mild low back pain & R LE pain. Pt reporting that she had recently received pain medication, that pain is tolerable, & agreeable to therapy session on first attempt. Pain level/location:    2/10       Location: Low back pain & R LE pain   Discharge recommendations  Anticipated discharge date: TBD  Destination: []home alone   []home alone with assist PRN     [] home w/ family      [] Continuous supervision  []SNF    [] Assisted living     [] Other:   Continued therapy: []C PT  []OUTPATIENT  PT   [] No Further PT  Equipment needs: TBD     Bed Mobility:           [x]   Pt received out of bed     Transfers:    Sit--> Stand: SBA  Stand --> Sit: SBA  Stand-Pivot: SBA    Assistive device required for transfer: RW  VC for hand placement, weight shifting to edge of seat, & attention to safety.     Gait: including toilet transfers, bed to chair, car transfers, sit/stands  Short term goal 3: pt will be mod I c ambulating 150 ft c LRAD   Short term goal 4: pt will be mod I c ascending 1 curb step c LRAD, in order to safely navigate the community  Short term goal 5: pt will be mod I c higher level mobility, including ambulating over uneven surfaces, picking an object up from the floor and dynamic standing activities with unilateral UE support, in order to increase overall independence throughout the community:  Long term goals  Time Frame for Long term goals : LTG = STG  Long term goal 1: Pt will be able to recall spinal precautions and describe functional applications of spinal precautions 3/3 times. Long term goal 2: Pt will be independent with a HEP to promote further BLE and core strength, to promote longterm strength and endurance  Long term goal 3: Pt will report reduced pain to a 4/10 with activity. Long term goal 4: Pt will be mod I c ascending/descending 4 stairs. :        Plan of Care                                                                              Times per week: 5 days per week for a minimum of 60 minutes/day plus group as appropriate for 60 minutes.   Treatment to include Current Treatment Recommendations: Strengthening, ROM, Transfer Training, Functional Mobility Training, IADL Training, Gait Training, Stair training, Endurance Training, Balance Training, Manual Therapy - Joint Manipulation, Manual Therapy - Soft Tissue Mobilization, Home Exercise Program, Equipment Evaluation, Education, & procurement, Modalities, Safety Education & Training, Pain Management, Patient/Caregiver Education & Training    Electronically signed by   Lj Segundo, PTA  2018, 10:57 AM

## 2018-12-24 LAB
GLUCOSE BLD-MCNC: 107 MG/DL (ref 70–99)
GLUCOSE BLD-MCNC: 128 MG/DL (ref 70–99)
GLUCOSE BLD-MCNC: 135 MG/DL (ref 70–99)
GLUCOSE BLD-MCNC: 147 MG/DL (ref 70–99)
GLUCOSE BLD-MCNC: 166 MG/DL (ref 70–99)

## 2018-12-24 PROCEDURE — 6370000000 HC RX 637 (ALT 250 FOR IP): Performed by: PHYSICAL MEDICINE & REHABILITATION

## 2018-12-24 PROCEDURE — 97530 THERAPEUTIC ACTIVITIES: CPT

## 2018-12-24 PROCEDURE — 99232 SBSQ HOSP IP/OBS MODERATE 35: CPT | Performed by: PHYSICAL MEDICINE & REHABILITATION

## 2018-12-24 PROCEDURE — 1280000000 HC REHAB R&B

## 2018-12-24 PROCEDURE — 97110 THERAPEUTIC EXERCISES: CPT

## 2018-12-24 PROCEDURE — 82962 GLUCOSE BLOOD TEST: CPT

## 2018-12-24 PROCEDURE — 97535 SELF CARE MNGMENT TRAINING: CPT

## 2018-12-24 PROCEDURE — 97116 GAIT TRAINING THERAPY: CPT

## 2018-12-24 PROCEDURE — 94761 N-INVAS EAR/PLS OXIMETRY MLT: CPT

## 2018-12-24 RX ORDER — SENNA PLUS 8.6 MG/1
2 TABLET ORAL 2 TIMES DAILY PRN
Qty: 120 TABLET | Refills: 0 | COMMUNITY
Start: 2018-12-24 | End: 2019-01-23

## 2018-12-24 RX ORDER — DIPHENHYDRAMINE HYDROCHLORIDE, ZINC ACETATE 2; .1 G/100G; G/100G
CREAM TOPICAL 4 TIMES DAILY
Status: DISCONTINUED | OUTPATIENT
Start: 2018-12-24 | End: 2018-12-25 | Stop reason: HOSPADM

## 2018-12-24 RX ORDER — ATENOLOL 50 MG/1
50 TABLET ORAL 2 TIMES DAILY
Qty: 60 TABLET | Refills: 0 | Status: SHIPPED | OUTPATIENT
Start: 2018-12-24

## 2018-12-24 RX ORDER — DIPHENHYDRAMINE HCL 25 MG
50 TABLET ORAL ONCE
Status: DISCONTINUED | OUTPATIENT
Start: 2018-12-24 | End: 2018-12-24 | Stop reason: CLARIF

## 2018-12-24 RX ORDER — OXYCODONE HYDROCHLORIDE AND ACETAMINOPHEN 5; 325 MG/1; MG/1
1 TABLET ORAL EVERY 4 HOURS PRN
Qty: 14 TABLET | Refills: 0 | Status: SHIPPED | OUTPATIENT
Start: 2018-12-24 | End: 2018-12-31

## 2018-12-24 RX ORDER — PSEUDOEPHEDRINE HCL 30 MG
100 TABLET ORAL 2 TIMES DAILY PRN
COMMUNITY
Start: 2018-12-24

## 2018-12-24 RX ADMIN — MICONAZOLE NITRATE: 2 POWDER TOPICAL at 21:30

## 2018-12-24 RX ADMIN — MICONAZOLE NITRATE: 2 POWDER TOPICAL at 08:22

## 2018-12-24 RX ADMIN — GLYBURIDE 10 MG: 5 TABLET ORAL at 08:21

## 2018-12-24 RX ADMIN — OXYCODONE AND ACETAMINOPHEN 2 TABLET: 5; 325 TABLET ORAL at 08:38

## 2018-12-24 RX ADMIN — METFORMIN HYDROCHLORIDE 500 MG: 500 TABLET ORAL at 08:21

## 2018-12-24 RX ADMIN — CETIRIZINE HYDROCHLORIDE 10 MG: 10 TABLET, FILM COATED ORAL at 21:29

## 2018-12-24 RX ADMIN — PANTOPRAZOLE SODIUM 40 MG: 40 TABLET, DELAYED RELEASE ORAL at 04:34

## 2018-12-24 RX ADMIN — METFORMIN HYDROCHLORIDE 500 MG: 500 TABLET ORAL at 17:43

## 2018-12-24 RX ADMIN — INSULIN GLARGINE 24 UNITS: 100 INJECTION, SOLUTION SUBCUTANEOUS at 21:44

## 2018-12-24 RX ADMIN — OMEGA-3-ACID ETHYL ESTERS 1 G: 1 CAPSULE, LIQUID FILLED ORAL at 21:30

## 2018-12-24 RX ADMIN — POTASSIUM CHLORIDE 20 MEQ: 20 TABLET, EXTENDED RELEASE ORAL at 17:43

## 2018-12-24 RX ADMIN — OXYCODONE AND ACETAMINOPHEN 1 TABLET: 5; 325 TABLET ORAL at 04:34

## 2018-12-24 RX ADMIN — ASPIRIN 81 MG 81 MG: 81 TABLET ORAL at 08:21

## 2018-12-24 RX ADMIN — POTASSIUM CHLORIDE 20 MEQ: 20 TABLET, EXTENDED RELEASE ORAL at 08:22

## 2018-12-24 RX ADMIN — TRIAMTERENE AND HYDROCHLOROTHIAZIDE 1 TABLET: 37.5; 25 TABLET ORAL at 08:22

## 2018-12-24 RX ADMIN — GLYBURIDE 10 MG: 5 TABLET ORAL at 17:43

## 2018-12-24 RX ADMIN — ATENOLOL 50 MG: 50 TABLET ORAL at 08:22

## 2018-12-24 RX ADMIN — DIPHENHYDRAMINE HYDROCHLORIDE, ZINC ACETATE: 2; .1 CREAM TOPICAL at 21:31

## 2018-12-24 RX ADMIN — ATENOLOL 50 MG: 50 TABLET ORAL at 21:39

## 2018-12-24 RX ADMIN — INSULIN LISPRO 1 UNITS: 100 INJECTION, SOLUTION INTRAVENOUS; SUBCUTANEOUS at 21:45

## 2018-12-24 RX ADMIN — OMEGA-3-ACID ETHYL ESTERS 1 G: 1 CAPSULE, LIQUID FILLED ORAL at 08:21

## 2018-12-24 ASSESSMENT — PAIN SCALES - GENERAL
PAINLEVEL_OUTOF10: 3
PAINLEVEL_OUTOF10: 4

## 2018-12-24 ASSESSMENT — PAIN DESCRIPTION - ONSET: ONSET: ON-GOING

## 2018-12-24 ASSESSMENT — PAIN DESCRIPTION - LOCATION: LOCATION: BACK

## 2018-12-24 ASSESSMENT — PAIN DESCRIPTION - PROGRESSION: CLINICAL_PROGRESSION: NOT CHANGED

## 2018-12-24 ASSESSMENT — PAIN DESCRIPTION - DESCRIPTORS: DESCRIPTORS: SHARP

## 2018-12-24 ASSESSMENT — PAIN DESCRIPTION - ORIENTATION: ORIENTATION: LOWER

## 2018-12-24 ASSESSMENT — PAIN DESCRIPTION - FREQUENCY: FREQUENCY: CONTINUOUS

## 2018-12-24 ASSESSMENT — PAIN DESCRIPTION - PAIN TYPE: TYPE: CHRONIC PAIN

## 2018-12-24 NOTE — PROGRESS NOTES
limited by pain   [] Patient limited by medical complications:    [] Adverse reaction to Tx:   [] Significant change in status    Safety:      Left up in:   []  bed       [x]  chair        Alarm:    [x] Alarm set      []  Pt refused alarms                []  Telesitter activated      [x]  Gait belt used during tx session      []Nurse notified      Number of Minutes/Billable Intervention  Therapeutic Exercise     Neuro Re-Ed    Gait 43   Therapeutic Activity 18   Wheelchair Propulsion    Group    Other:    TOTAL 61         Social History  Social/Functional History  Lives With: Alone  Type of Home: Apartment  Home Layout: One level (Pt is on 1st floor of building)  Home Access: Level entry  Bathroom Shower/Tub: Walk-in shower, Shower chair without back  Bathroom Toilet: Standard (Uses vanity on R to assist with transfer)  Bathroom Equipment: Grab bars in shower, Shower chair, Grab bars around toilet  Bathroom Accessibility: Walker accessible  Home Equipment: 4 wheeled walker, Lift chair, [address] Help From: Home health (Aide 2x/week (2 hrs each Monday and Thursday))  ADL Assistance: Independent  Homemaking Assistance: Needs assistance  Homemaking Responsibilities: Yes  Meal Prep Responsibility: Secondary (Has Meals on Wheels; uses microwave or Crock Pot mostly)  Laundry Responsibility: No (Aide performs)  Cleaning Responsibility: Secondary (Aide mostly performs)  Bill Paying/Finance Responsibility: Primary  Shopping Responsibility: Primary (Daughter would help only occasionally)  Dependent Care Responsibility: No  Health Care Management: Primary (Uses pillbox)  Ambulation Assistance: Independent (Mod I c [id number])  Transfer Assistance: Independent  Active : Yes  Mode of Transportation: Car  Occupation: Retired  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   Pt reports 2 falls

## 2018-12-24 NOTE — PROGRESS NOTES
Occupational Therapy   Physical Rehabilitation: OCCUPATIONAL THERAPY     [x] daily progress note       [x] discharge       Patient Name:  Giovanny Leal   :  1945 MRN: [id number]  Room:  [address] [place] [address] Date of Admission: 2018  Rehabilitation Diagnosis:   Spondylosis of lumbar spine [M47.816]       Date          2018       Day of ARU Week:  5   Time IN/OUT 4282-5769   Individual Tx Minutes 60   Group Tx Minutes    Co-Treat Minutes    Concurrent Tx Minutes    TOTAL Tx Time Mins 60   Variance Time    Variance Time []   Refusal due to:     []   Medical hold/reason:    []   Illness   []   Off Unit for test/procedure  []   Extra time needed to complete task  []   Therapeutic need  []   Other (specify):   Restrictions Restrictions/Precautions  Restrictions/Precautions: General Precautions, Fall Risk, Surgical Protocols (Spinal precautions; no showering for 1 week)  Position Activity Restriction  Spinal Precautions: No Bending, No Lifting, No Twisting (>5#)   Communication with other providers: [x]   OK to see per nursing:     []   Spoke with team member regarding:      Subjective observations and cognitive status: Pleasant, cooperative   Pain level/location:    /10       Location:   No c/o   Discharge recommendations  Anticipated discharge date:  TBD  Destination: []home alone   []home alone w assist prn [] home w/ family    [] Continuous supervision       []SNF            [] Assisted living     [] Other:   Continued therapy: []HHC OT  []OUTPATIENT  OT   [] No Further OT  Equipment needs: none   (HIT F2 to transition between stars)    Eating/Feeding:   IND  Extremity Used:      [x]   R UE       [x]   L UE         Adaptive Equipment Used:        Grooming:   IND      Bathing: Mod I sinkside c A/E.  100% BLT precautions   Shower against precautions at this time    Dressing:      Upper Body Dressing: Mod I  Lower Body Dressing: Mod I      Toileting: Mod I        Toilet Transfers:  Mod I  Device Used: Comments: Pt sleeps in a flat, regular bed at home. Pt reports 2 falls in the past year without injury. Objective                                                                                    Goals:  (Update in navigator)  Short term goals  Time Frame for Short term goals: STGs=LTGs:  Long term goals  Time Frame for Long term goals : 7-10 days or until d/c. Long term goal 1: Pt will complete toileting mod I. MET 12/24  Long term goal 2: Pt will complete UB dressing I; LB dressing mod I using AE. MET 12/24  Long term goal 3: Pt will complete grooming tasks I. MET 12/24  Long term goal 4: Pt will complete total body bathing mod I. MET 12/24  Long term goal 5: Pt will complete functional transfers (bed, chair, shower, toilet) c DME PRN and mod I. MET 12/24  Long term goals 6: Pt will perform therex/therax to facilitate increased strength/endurance/ax tolerance (c emphasis on dynamic standing balance/tolerance >12 mins) c SBA. MET 12/24  Long term goal 7: Pt will complete simple homemaking tasks c DME PRN and mod I.  NA:        Plan of Care                                                                              Times per week: 5 days per week for a minimum of 60 minutes/day plus group as appropriate for 60 minutes.   Treatment to include Plan  Times per day: Daily  Current Treatment Recommendations: Balance Training, Functional Mobility Training, Endurance Training, Pain Management, Safety Education & Training, Patient/Caregiver Education & Training, Equipment Evaluation, Education, & procurement, Self-Care / ADL, Home Management Training    Electronically signed by   Matt Brewster. stacy Benitez  12/24/2018, 8:11 AM

## 2018-12-25 VITALS
DIASTOLIC BLOOD PRESSURE: 59 MMHG | HEART RATE: 67 BPM | TEMPERATURE: 97.8 F | OXYGEN SATURATION: 95 % | HEIGHT: 67 IN | WEIGHT: 277.9 LBS | RESPIRATION RATE: 16 BRPM | BODY MASS INDEX: 43.62 KG/M2 | SYSTOLIC BLOOD PRESSURE: 123 MMHG

## 2018-12-25 LAB
GLUCOSE BLD-MCNC: 117 MG/DL (ref 70–99)
GLUCOSE BLD-MCNC: 145 MG/DL (ref 70–99)

## 2018-12-25 PROCEDURE — 82962 GLUCOSE BLOOD TEST: CPT

## 2018-12-25 PROCEDURE — 94761 N-INVAS EAR/PLS OXIMETRY MLT: CPT

## 2018-12-25 PROCEDURE — 99239 HOSP IP/OBS DSCHRG MGMT >30: CPT | Performed by: PHYSICAL MEDICINE & REHABILITATION

## 2018-12-25 PROCEDURE — 94150 VITAL CAPACITY TEST: CPT

## 2018-12-25 PROCEDURE — 6370000000 HC RX 637 (ALT 250 FOR IP): Performed by: PHYSICAL MEDICINE & REHABILITATION

## 2018-12-25 RX ADMIN — METFORMIN HYDROCHLORIDE 500 MG: 500 TABLET ORAL at 10:31

## 2018-12-25 RX ADMIN — DIPHENHYDRAMINE HYDROCHLORIDE, ZINC ACETATE: 2; .1 CREAM TOPICAL at 11:53

## 2018-12-25 RX ADMIN — ATENOLOL 50 MG: 50 TABLET ORAL at 10:32

## 2018-12-25 RX ADMIN — GLYBURIDE 10 MG: 5 TABLET ORAL at 10:33

## 2018-12-25 RX ADMIN — POTASSIUM CHLORIDE 20 MEQ: 20 TABLET, EXTENDED RELEASE ORAL at 10:32

## 2018-12-25 RX ADMIN — OXYCODONE AND ACETAMINOPHEN 1 TABLET: 5; 325 TABLET ORAL at 10:31

## 2018-12-25 RX ADMIN — OMEGA-3-ACID ETHYL ESTERS 1 G: 1 CAPSULE, LIQUID FILLED ORAL at 10:31

## 2018-12-25 RX ADMIN — ASPIRIN 81 MG 81 MG: 81 TABLET ORAL at 10:32

## 2018-12-25 RX ADMIN — INSULIN LISPRO 1 UNITS: 100 INJECTION, SOLUTION INTRAVENOUS; SUBCUTANEOUS at 12:53

## 2018-12-25 RX ADMIN — PANTOPRAZOLE SODIUM 40 MG: 40 TABLET, DELAYED RELEASE ORAL at 05:50

## 2018-12-25 RX ADMIN — TRIAMTERENE AND HYDROCHLOROTHIAZIDE 1 TABLET: 37.5; 25 TABLET ORAL at 10:31

## 2018-12-25 RX ADMIN — MICONAZOLE NITRATE: 2 POWDER TOPICAL at 11:53

## 2018-12-25 RX ADMIN — OXYCODONE AND ACETAMINOPHEN 1 TABLET: 5; 325 TABLET ORAL at 00:08

## 2018-12-25 ASSESSMENT — PAIN SCALES - GENERAL
PAINLEVEL_OUTOF10: 3
PAINLEVEL_OUTOF10: 3

## 2018-12-31 NOTE — DISCHARGE SUMMARY
resp. rate 16, height 5' 7\" (1.702 m), weight 277 lb 14.4 oz (126.1 kg), SpO2 95 %, not currently breastfeeding. GENERAL:  The patient was seen sitting up in a wheelchair. Alert and oriented ×3. Speech was clear but reasoning was inconsistent. HEENT:  Visual fields are full. Mucous membranes are moist.    NECK:  Supple. LUNGS:  Her respirations are clearing. Breath sounds are distant with no wheezes or rales noted. HEART:  Sounds distant with a regular rate and rhythm noted. ABDOMEN:  Her obese abdomen is soft. Bowel sounds are present. There is no rebound, guarding, or masses noted. BACK:  Surgical incision is well-approximated and dry. No bogginess. There is no drainage or erythema. Savona Churn EXTREMITIES:  The patient moves her upper limbs through a functional range of motion with 4+/5 strength. Dexterity is functional.    Lower limb movements were cautiously performed but done so through a full range of motion. Strength was now 4/5 everywhere. There is no new lower limb edema in both heels are clear. No signs of DVT. Sitting balance is good. Standing balance is fair. VC's and supervision with transfers and most personal care except for lower body dressing and showering. LABORATORY TESTING:  White blood count 7.8. Hemoglobin 13.1. Sodium 136. Calcium 3.5. BUN/creatinine 17/1.0. IMPRESSION:  A 68-year-old female with lumbar spinal stenosis and radiculopathy, treated with lumbar laminectomy on 12/19/2018. She has fairly-controlled pain and diabetes, elevated blood pressure, transient urinary retention, and improving gait disturbance with morbid obesity. Limitations include her age, that she lives alone, and her obesity. The patient presented to the ARU with the above history requiring a multidisciplinary treatment plan including close medical supervision by the Bridger Casarez Director.  The patient participated in the prescribed therapy treatment plan with reasonable compliance and progressive tolerance. They avoided significant medical complications. By the time of discharge the patient had become safer with adaptive equipment to transfer and toilet with a FWW with the supervision of family/caregivers. The patient was tolerating an oral diet without choking/coughing and was back to their baseline with regards to bowel and bladder control. Discharge instructions were reviewed with the patient and her daughter. The patient is to follow up with the surgeon and her PCP in 1 week. No driving. [facility] PT/OT will be arranged. John Carnes   Home Medication Instructions TERENCE:[id number]    Printed on:12/31/18 1910   Medication Information                      aspirin 81 MG chewable tablet  Take 81 mg by mouth daily             atenolol (TENORMIN) 50 MG tablet  Take 1 tablet by mouth 2 times daily             cetirizine (ZYRTEC) 5 MG tablet  Take 10 mg by mouth every 12 hours              docusate sodium (COLACE, DULCOLAX) 100 MG CAPS  Take 100 mg by mouth 2 times daily as needed for Constipation             glyBURIDE (DIABETA) 2.5 MG tablet  Take 5 mg by mouth 2 times daily (with meals)              insulin aspart (NOVOLOG FLEXPEN) 100 UNIT/ML injection pen  Inject into the skin 3 times daily (before meals)             insulin detemir (LEVEMIR FLEXPEN) 100 UNIT/ML injection pen  Inject 24 Units into the skin nightly             metFORMIN (GLUCOPHAGE) 500 MG tablet  Take 500 mg by mouth 2 times daily (with meals)             Nutritional Supplements (JUICE PLUS FIBRE PO)  Take 2 capsules by mouth             nystatin (MYCOSTATIN) 137941 UNIT/GM powder  Apply topically 2 times daily as needed Apply topically 4 times daily. Omega 3-6-9 Fatty Acids (OMEGA 3-6-9 COMPLEX) CAPS  Take 1 capsule by mouth 2 times daily             omega-3 acid ethyl esters (LOVAZA) 1 G capsule  Take 1 capsule by mouth 2 times daily.              omeprazole (PRILOSEC) 20 MG capsule  Take 20 mg by mouth

## 2020-04-16 ENCOUNTER — INITIAL CONSULT (OUTPATIENT)
Dept: PULMONOLOGY | Age: 75
End: 2020-04-16
Payer: MEDICARE

## 2020-04-16 VITALS
SYSTOLIC BLOOD PRESSURE: 128 MMHG | BODY MASS INDEX: 41.84 KG/M2 | WEIGHT: 266.6 LBS | HEIGHT: 67 IN | DIASTOLIC BLOOD PRESSURE: 78 MMHG | OXYGEN SATURATION: 97 % | TEMPERATURE: 97.9 F | HEART RATE: 76 BPM

## 2020-04-16 DIAGNOSIS — R06.02 SOB (SHORTNESS OF BREATH): ICD-10-CM

## 2020-04-16 DIAGNOSIS — J30.89 OTHER ALLERGIC RHINITIS: ICD-10-CM

## 2020-04-16 DIAGNOSIS — R05.9 COUGH: ICD-10-CM

## 2020-04-16 PROCEDURE — G8417 CALC BMI ABV UP PARAM F/U: HCPCS | Performed by: NURSE PRACTITIONER

## 2020-04-16 PROCEDURE — 1090F PRES/ABSN URINE INCON ASSESS: CPT | Performed by: NURSE PRACTITIONER

## 2020-04-16 PROCEDURE — 99203 OFFICE O/P NEW LOW 30 MIN: CPT | Performed by: NURSE PRACTITIONER

## 2020-04-16 PROCEDURE — G8427 DOCREV CUR MEDS BY ELIG CLIN: HCPCS | Performed by: NURSE PRACTITIONER

## 2020-04-16 RX ORDER — ALBUTEROL SULFATE 90 UG/1
2 AEROSOL, METERED RESPIRATORY (INHALATION) EVERY 6 HOURS PRN
COMMUNITY
Start: 2020-04-09 | End: 2020-06-02

## 2020-04-16 ASSESSMENT — SLEEP AND FATIGUE QUESTIONNAIRES
NECK CIRCUMFERENCE (INCHES): 17
HOW LIKELY ARE YOU TO NOD OFF OR FALL ASLEEP WHILE SITTING AND TALKING TO SOMEONE: 0
HOW LIKELY ARE YOU TO NOD OFF OR FALL ASLEEP WHEN YOU ARE A PASSENGER IN A CAR FOR AN HOUR WITHOUT A BREAK: 0
HOW LIKELY ARE YOU TO NOD OFF OR FALL ASLEEP WHILE LYING DOWN TO REST IN THE AFTERNOON WHEN CIRCUMSTANCES PERMIT: 3
HOW LIKELY ARE YOU TO NOD OFF OR FALL ASLEEP WHILE SITTING AND READING: 0
HOW LIKELY ARE YOU TO NOD OFF OR FALL ASLEEP WHILE SITTING QUIETLY AFTER LUNCH WITHOUT ALCOHOL: 0
HOW LIKELY ARE YOU TO NOD OFF OR FALL ASLEEP IN A CAR, WHILE STOPPED FOR A FEW MINUTES IN TRAFFIC: 0
HOW LIKELY ARE YOU TO NOD OFF OR FALL ASLEEP WHILE WATCHING TV: 0
ESS TOTAL SCORE: 3
HOW LIKELY ARE YOU TO NOD OFF OR FALL ASLEEP WHILE SITTING INACTIVE IN A PUBLIC PLACE: 0

## 2020-04-16 NOTE — PROGRESS NOTES
of children: None    Years of education: None    Highest education level: None   Occupational History    None   Social Needs    Financial resource strain: None    Food insecurity     Worry: None     Inability: None    Transportation needs     Medical: None     Non-medical: None   Tobacco Use    Smoking status: Never Smoker    Smokeless tobacco: Never Used   Substance and Sexual Activity    Alcohol use: No    Drug use: None    Sexual activity: None   Lifestyle    Physical activity     Days per week: None     Minutes per session: None    Stress: None   Relationships    Social connections     Talks on phone: None     Gets together: None     Attends Congregation service: None     Active member of club or organization: None     Attends meetings of clubs or organizations: None     Relationship status: None    Intimate partner violence     Fear of current or ex partner: None     Emotionally abused: None     Physically abused: None     Forced sexual activity: None   Other Topics Concern    None   Social History Narrative    None       Family History:    Family History   Problem Relation Age of Onset    Cancer Mother     Diabetes Mother     High Blood Pressure Mother     Heart Disease Mother     Diabetes Father          REVIEW OF SYSTEMS:    CONSTITUTIONAL:  negative for fevers, chills, diaphoresis, activity change, appetite change, night sweats and unexpected weight change.    HEENT:  negative for hearing loss,  sinus pressure, nasal congestion, epistaxis and snoring  RESPIRATORY: Positive intermittent productive cough, negative wheeze, negative shortness of breath  CARDIOVASCULAR:  Negative for chest pain, palpitations, exertional chest pressure/discomfort, edema, syncope  GASTROINTESTINAL: negative for nausea, vomiting, diarrhea, constipation, blood in stool and abdominal pain  GENITOURINARY:  negative for frequency, dysuria and hematuria  HEMATOLOGIC/LYMPHATIC:  negative for easy bruising, bleeding and MCV 96.0 12/21/2018    MCH 31.2 12/21/2018    MCHC 32.5 12/21/2018    RDW 13.2 12/21/2018    SEGSPCT 54.5 08/29/2012    LYMPHOPCT 23.8 08/29/2012    MONOPCT 16.9 08/29/2012    EOSPCT 4.5 04/11/2012    BASOPCT 1.0 08/29/2012    MONOSABS 1.0 08/29/2012    LYMPHSABS 1.4 08/29/2012    EOSABS 0.2 08/29/2012    BASOSABS 0.1 08/29/2012    DIFFTYPE AUTOMATED DIFFERENTIAL 08/29/2012     BMP:    Lab Results   Component Value Date     12/21/2018    K 3.5 12/21/2018    CL 98 12/21/2018    CO2 27 12/21/2018    BUN 17 12/21/2018    CREATININE 1.0 12/21/2018    CALCIUM 8.5 12/21/2018    GFRAA >60 12/21/2018    LABGLOM 54 12/21/2018    GLUCOSE 162 12/21/2018     Hepatic Function Panel:    Lab Results   Component Value Date    ALKPHOS 82 08/29/2012    ALT 16 08/29/2012    AST 27 08/29/2012    PROT 7.6 08/29/2012    BILITOT 0.7 08/29/2012     ABG:  No results found for: PDF1OYG, BEART, P8LGMGQS, PHART, THGBART, ZGG3KVH, PO2ART, NBF8ESM    RADIOLOGY:    PFT: To be obtained    Assessment      1. Cough  Differential diagnosis to be worked up include:  Chronic bronchitis -no recent chest x-ray, is a non-smoker, no secondhand exposure, has recently completed antibiotics prescribed by primary care provider    Postnasal drip -on exam does have clear rhinorrhea along with nares inflammation. Nasal steroids, allergy testing have been ordered    Asthma -no history of asthma, no history of frequent respiratory illnesses as a child, does have an albuterol rescue inhaler which she is using which she feels is not helping with the cough.     GERD -states she does have occasional reflux, I have suggested no eating 3 hours before bedtime, avoid reflux disease foods such as fatty foods, alcohol, elevate head of bed with some wedges or use can bed, she is currently on omeprazole    Sinusitis -no tenderness over sinus area upon exam, does have positive clear rhinorrhea, we will get allergy testing    Medication induced cough -she is not currently on any ACE inhibitors, does not recall being placed on ACE inhibitors    Eosinophilic bronchitis -low suspicion, will get CBC to check for eosinophil elevation     Bronchiectasis -has recently completed antibiotics with no change in symptoms    Interstitial lung disease -low suspicion at this time    Lung cancer  -no symptoms of, low suspicion at this time    Congestive heart failure -no symptoms of    Psychogenic cough - behavior modification therapy    2. SOB (shortness of breath)  Shortness of breath is with activity, we will get pulmonary function test once outpatient lab reopens, we will get blood work for alpha 1 antitrypsin, RAST testing region 5, and a 6-minute walk test.  She is to monitor when she is short of breath, if she feels she is needing her albuterol rescue inhaler more than 3-4 times a day she is to call our office. 3. Other allergic rhinitis   Recommend using Flonase, we will get allergy testing    4. Healthcare maintenance  We have discussed the need to maintain yearly flu immunization, pneumococcal vaccination. We have discussed Coronavirus precaution including handwashing practice, wiping items touched in public such as gas pumps, door handles, shopping carts, etc. Self monitoring for infection - fever, chills, cough, SOB. Should they develop symptoms they should call office for further instructions. Return in about 3 months (around 7/16/2020) for Follow Up, PFT, 6 Minute Walk Test.    This dictation was performed with a verbal recognition program and it was checked for errors. It is possible that there are still dictated errors within this office note. Any errors should be brought immediately to my attention for correction. All efforts were made to ensure that this office note is accurate.        Electronically signed by NADER Henley CNP on 5/4/2020 at 4:59 PM

## 2020-04-18 LAB
ALLERGEN ASPERGILLUS ALTERNATA IGE: <0.1 KU/L
ALLERGEN ASPERGILLUS FUMIGATUS IGE: <0.1 KU/L
ALLERGEN BERMUDA GRASS IGE: <0.1 KU/L
ALLERGEN BIRCH IGE: <0.1 KU/L
ALLERGEN CAT DANDER IGE: <0.1 KU/L
ALLERGEN COMMON SHORT RAGWEED IGE: <0.1 KU/L
ALLERGEN COTTONWOOD: <0.1 KU/L
ALLERGEN DOG DANDER IGE: <0.1 KU/L
ALLERGEN ELM IGE: <0.1 KU/L
ALLERGEN FUNGI/MOLD M.RACEMOSUS IGE: <0.1 KU/L
ALLERGEN GERMAN COCKROACH IGE: <0.1 KU/L
ALLERGEN HORMODENDRUM HORDEI IGE: <0.1 KU/L
ALLERGEN MAPLE/BOX ELDER IGE: <0.1 KU/L
ALLERGEN MITE DUST FARINAE IGE: <0.1 KU/L
ALLERGEN MITE DUST PTERONYSSINUS IGE: <0.1 KU/L
ALLERGEN MOUNTAIN CEDAR: <0.1 KU/L
ALLERGEN MOUSE EPITHELIA IGE: <0.1 KU/L
ALLERGEN OAK TREE IGE: <0.1 KU/L
ALLERGEN PECAN TREE IGE: <0.1 KU/L
ALLERGEN PENICILLIUM NOTATUM: <0.1 KU/L
ALLERGEN ROUGH PIGWEED (W14) IGE: <0.1 KU/L
ALLERGEN RUSSIAN THISTLE IGE: <0.1 KU/L
ALLERGEN SEE NOTE: NORMAL
ALLERGEN SHEEP SORREL (W18) IGE: <0.1 KU/L
ALLERGEN TIMOTHY GRASS: <0.1 KU/L
ALLERGEN TREE SYCAMORE: <0.1 KU/L
ALLERGEN WALNUT TREE IGE: <0.1 KU/L
ALLERGEN WHITE MULBERRY TREE, IGE: <0.1 KU/L
ALLERGEN, TREE, WHITE ASH IGE: <0.1 KU/L
ALPHA-1 ANTITRYPSIN: 122 MG/DL (ref 90–200)
IGE: 4 KU/L

## 2020-04-22 ENCOUNTER — TELEPHONE (OUTPATIENT)
Dept: PULMONOLOGY | Age: 75
End: 2020-04-22

## 2020-04-22 ENCOUNTER — VIRTUAL VISIT (OUTPATIENT)
Dept: PULMONOLOGY | Age: 75
End: 2020-04-22
Payer: MEDICARE

## 2020-04-22 PROCEDURE — 99442 PR PHYS/QHP TELEPHONE EVALUATION 11-20 MIN: CPT | Performed by: NURSE PRACTITIONER

## 2020-04-22 RX ORDER — DOXYCYCLINE HYCLATE 100 MG
100 TABLET ORAL 2 TIMES DAILY
Qty: 14 TABLET | Refills: 0 | Status: SHIPPED | OUTPATIENT
Start: 2020-04-22 | End: 2020-04-29

## 2020-04-22 RX ORDER — BENZONATATE 100 MG/1
100 CAPSULE ORAL 3 TIMES DAILY PRN
Qty: 60 CAPSULE | Refills: 0 | Status: SHIPPED | OUTPATIENT
Start: 2020-04-22 | End: 2020-04-29

## 2020-04-27 ENCOUNTER — TELEPHONE (OUTPATIENT)
Dept: PULMONOLOGY | Age: 75
End: 2020-04-27

## 2020-04-28 ENCOUNTER — TELEPHONE (OUTPATIENT)
Dept: PULMONOLOGY | Age: 75
End: 2020-04-28

## 2020-06-02 ENCOUNTER — OFFICE VISIT (OUTPATIENT)
Dept: PULMONOLOGY | Age: 75
End: 2020-06-02
Payer: MEDICARE

## 2020-06-02 VITALS
SYSTOLIC BLOOD PRESSURE: 122 MMHG | HEART RATE: 78 BPM | HEIGHT: 67 IN | TEMPERATURE: 97.7 F | WEIGHT: 266.8 LBS | OXYGEN SATURATION: 98 % | DIASTOLIC BLOOD PRESSURE: 80 MMHG | BODY MASS INDEX: 41.88 KG/M2

## 2020-06-02 PROCEDURE — 1090F PRES/ABSN URINE INCON ASSESS: CPT | Performed by: NURSE PRACTITIONER

## 2020-06-02 PROCEDURE — G8417 CALC BMI ABV UP PARAM F/U: HCPCS | Performed by: NURSE PRACTITIONER

## 2020-06-02 PROCEDURE — G8427 DOCREV CUR MEDS BY ELIG CLIN: HCPCS | Performed by: NURSE PRACTITIONER

## 2020-06-02 PROCEDURE — G8400 PT W/DXA NO RESULTS DOC: HCPCS | Performed by: NURSE PRACTITIONER

## 2020-06-02 PROCEDURE — 1123F ACP DISCUSS/DSCN MKR DOCD: CPT | Performed by: NURSE PRACTITIONER

## 2020-06-02 PROCEDURE — 1036F TOBACCO NON-USER: CPT | Performed by: NURSE PRACTITIONER

## 2020-06-02 PROCEDURE — 4040F PNEUMOC VAC/ADMIN/RCVD: CPT | Performed by: NURSE PRACTITIONER

## 2020-06-02 PROCEDURE — 99213 OFFICE O/P EST LOW 20 MIN: CPT | Performed by: NURSE PRACTITIONER

## 2020-06-02 PROCEDURE — 3017F COLORECTAL CA SCREEN DOC REV: CPT | Performed by: NURSE PRACTITIONER

## 2020-06-02 RX ORDER — FLUTICASONE PROPIONATE 50 MCG
2 SPRAY, SUSPENSION (ML) NASAL DAILY
COMMUNITY
Start: 2020-05-01

## 2020-06-02 NOTE — PROGRESS NOTES
Pulmonary Clinic Office Follow up     Reyna Allen is a 76 y.o. female with history of DM 2, GERD, HTN, morbid obesity with BMI 43, returns to the pulmonary clinic today for follow-up and discussion of recent pulmonary function test.  She initially presented to the pulmonary clinic with persistent cough that had started back in February. Her cough has significantly improved but she states she is very fatigued. She denies shortness of breath. She denies any recent fever or chills. She denies any change in her sleep habits. She states some days she feels like she slept well the other days she has not. She does not feel that her sleep has affected her fatigue. Dagmar Galvan completed her pulmonary function test at [address] [place] [address], overall testing demonstrates good effort and cooperation, test is valid meeting ATS standards and review ability, no bronchiolitis for used prior to testing, overall testing demonstrates no significant obstructive mechanics of the lungs and small airways. Lung volumes are within normal limits, flow volume loops show no evidence of upper airway obstruction. She has been continuing to socially isolate herself and social distance when she is out due to the coronavirus pandemic. She has been wearing a mask when she needs to go out, practicing handwashing or using hand .   She denies any recent contact with anyone with fever, chills, upper respiratory infection, coronavirus positive or under investigation for possible coronavirus exposure    Influenza 9/20/2019   pneumococcal immunization: 2/25/2020 PPV 23, 10/15/2018 Prevnar 13, 11/15/2006 Prevnar 13  Smoker never smoked limited secondhand exposure no use of smokeless tobacco products  PCP Altaf Bay MD    Past Medical History:  Past Medical History:   Diagnosis Date    4/16/2020), Disp: , Rfl:     nystatin (MYCOSTATIN) 925919 UNIT/GM powder, Apply topically 2 times daily as needed Apply topically 4 times daily. , Disp: , Rfl:     Nutritional Supplements (JUICE PLUS FIBRE PO), Take 2 capsules by mouth, Disp: , Rfl:     omega-3 acid ethyl esters (LOVAZA) 1 G capsule, Take 1 capsule by mouth 2 times daily. (Patient not taking: Reported on 4/16/2020), Disp: 60 capsule, Rfl: 0    Allergies   Allergen Reactions    Codeine      Nightmares    Penicillins      Edema and rash    Tape Savannah Galeazzi Tape]      Edema and blister    Ultram [Tramadol] Anaphylaxis    Orphenadrine      Diarrhea and rash    Promethazine      unsure    Bactrim [Sulfamethoxazole-Trimethoprim] Rash       Social History:    Social History     Socioeconomic History    Marital status:       Spouse name: None    Number of children: None    Years of education: None    Highest education level: None   Occupational History    None   Social Needs    Financial resource strain: None    Food insecurity     Worry: None     Inability: None    Transportation needs     Medical: None     Non-medical: None   Tobacco Use    Smoking status: Never Smoker    Smokeless tobacco: Never Used   Substance and Sexual Activity    Alcohol use: No    Drug use: None    Sexual activity: None   Lifestyle    Physical activity     Days per week: None     Minutes per session: None    Stress: None   Relationships    Social connections     Talks on phone: None     Gets together: None     Attends [employer] service: None     Active member of club or organization: None     Attends meetings of clubs or organizations: None     Relationship status: None    Intimate partner violence     Fear of current or ex partner: None     Emotionally abused: None     Physically abused: None     Forced sexual activity: None   Other Topics Concern    None   Social History Narrative    None       Family History:    Family History   Problem Relation Age of Onset    Cancer Mother     Diabetes Mother     High Blood Pressure Mother     Heart Disease Mother     Diabetes Father          REVIEW OF SYSTEMS:    CONSTITUTIONAL:  negative for fevers, chills, diaphoresis, activity change, appetite change, night sweats and unexpected weight change,  Positive fatigue, positive hypersomnia  HEENT:  negative for hearing loss, sinus pressure, nasal congestion, epistaxis and snoring  RESPIRATORY: Negative shortness of breath, negative cough, negative wheeze  CARDIOVASCULAR:  Negative for chest pain, palpitations, exertional chest pressure/discomfort, edema, syncope  GASTROINTESTINAL: negative for nausea, vomiting, diarrhea, constipation, blood in stool and abdominal pain  GENITOURINARY:  negative for frequency, dysuria and hematuria  HEMATOLOGIC/LYMPHATIC:  negative for easy bruising, bleeding and lymphadenopathy  ALLERGIC/IMMUNOLOGIC:  negative for recurrent infections, angioedema, anaphylaxis and drug reaction  MUSCULOSKELETAL:  negative for pain, joint swelling, decreased range of motion and muscle weakness  NEURO: negative for headache, AMS, decrease sensations  SKIN: Negative for rashes or lesions      Physical Exam:  /80 (Site: Right Upper Arm, Position: Sitting, Cuff Size: Large Adult)   Pulse 78   Temp 97.7 °F (36.5 °C) (Oral)   Ht 5' 7\" (1.702 m)   Wt 266 lb 12.8 oz (121 kg)   SpO2 98%   BMI 41.79 kg/m²    Body mass index is 41.79 kg/m². Constitutional:  She appears well developed and well-nourished. Neck:  Supple, no palpable lymphadenopathy, no JVD  Cardiovascular:  S1, S2 Normal, Regular rhythm, no murmurs or gallops, no pericardial  rubs.   Pulmonary:  Bilateral breath sounds clear and equal, good air movement, no use of accessory muscles to support respiratory effort, no wheeze, no rhonchi, no rales, no cough noted during exam  Abdomen: No epigastric pain, no distention, + bowel sounds   Extremities: No edema, no calf tenderness, no clubbing of

## 2020-06-11 ENCOUNTER — TELEPHONE (OUTPATIENT)
Dept: PULMONOLOGY | Age: 75
End: 2020-06-11

## 2021-08-04 ENCOUNTER — TELEPHONE (OUTPATIENT)
Dept: GASTROENTEROLOGY | Age: 76
End: 2021-08-04

## 2021-08-04 NOTE — TELEPHONE ENCOUNTER
Called pt. In regards to a referral sent from [address] [place] [address] for Steatosis of Liver. Talked to pt. And pt. States they were confused why they were referred and would like to talk with PCP before scheduling an appt.

## 2022-05-13 NOTE — PROGRESS NOTES
Occupational Therapy   Physical Rehabilitation: OCCUPATIONAL THERAPY     [x] daily progress note       [] discharge       Patient Name:  iLli Garcia   :  1945 MRN: [id number]  Room:  [address] [place] [address] Date of Admission: 2018    Rehabilitation Diagnosis:     Spondylosis of lumbar spine [M47.816]    Social History       Objective                                                                                    Goals:  (Update in navigator)      :    :    Plan of Care:  Pt to be seen at least  5x per week for a minimum of 60 minutes as appropriate. Date           2018   TIMES IN/OUT   [id number]   Group Tx Minutes 60   TOTAL Tx time seen 60   Variance Time    Variance Reason    [] Refusal due to   [] Medical hold/reason  [] Illness   [] Off Unit for test/procedure  [] Extra time needed to complete task  [] Other (specify)   Restrictions/Precautions           Current Diet/Swallowing Issues DIET CARB CONTROL; Communication with other providers: [x] Ok to see per nursing at morning huddle  [] Medical hold and reason  [] Spoke with (team    member) regarding   Subjective observations: Pt agreeable to group session. Pain level/location:    Alarm placed/where? Fall Risk  [] Pt taken to DR for lunch with nsg present   [] Pt taken back to room with chair/bed alarm in place         Group Activity:    Total time in group = 60min   Barriers (Home & Community) Group: Completed discussion and practice of barriers encountered in home and community settings including hazards, weather considerations, travel preparations, safe use of assistive device(s), and assistance needs. Focused on problem-solving a variety of situations related to being at home and in the community; performing physical tasks safely (device safety, maneuvering steps, curbs, tight spaces, obstacles).  Focused on endurance monitoring & strategies prn, problem solving, functional mobility, and general social & communication opportunities with other group members.       Functional areas targeted:   [x] Communication [x] Memory  [x] Coordination    [] Kitchen Safety [x] Problem Solving  [x] Strengthening     [x] Attention  [x] Endurance   [] Mobility    [x] Awareness/Insight [x] Balance  [] Transfers  [x] Social/Pragmatics [] Sequencing  [] Equipment Use    [x] Safety   [] Other (specify)    Participation:  [x] Actively participated               Assessment / Impression                                                          Treatment/Activity Tolerance:   [x] Tolerated Treatment well:     [] Patient limited by fatigue/pain:       [] Patient limited by medical complications:    [] Adverse Reaction to Tx:   [] Significant change in status    Number of Minutes/Billable Intervention  Therapeutic Exercise    ADL Self-care    Neuro Re-Ed    Therapeutic Activity    Group 60   Other:    TOTAL 60     Electronically signed by   stacy Estrella  12/21/2018, 8:11 AM No